# Patient Record
Sex: FEMALE | Race: WHITE | NOT HISPANIC OR LATINO | URBAN - METROPOLITAN AREA
[De-identification: names, ages, dates, MRNs, and addresses within clinical notes are randomized per-mention and may not be internally consistent; named-entity substitution may affect disease eponyms.]

---

## 2024-02-05 ENCOUNTER — LAB REQUISITION (OUTPATIENT)
Dept: LAB | Facility: HOSPITAL | Age: 79
End: 2024-02-05
Payer: MEDICARE

## 2024-02-05 DIAGNOSIS — R53.1 WEAKNESS: ICD-10-CM

## 2024-02-05 DIAGNOSIS — I10 ESSENTIAL (PRIMARY) HYPERTENSION: ICD-10-CM

## 2024-02-05 LAB
ANION GAP SERPL CALC-SCNC: 11 MMOL/L (ref 10–20)
BASOPHILS # BLD AUTO: 0.04 X10*3/UL (ref 0–0.1)
BASOPHILS NFR BLD AUTO: 0.3 %
BUN SERPL-MCNC: 19 MG/DL (ref 6–23)
CALCIUM SERPL-MCNC: 7.7 MG/DL (ref 8.6–10.3)
CHLORIDE SERPL-SCNC: 100 MMOL/L (ref 98–107)
CO2 SERPL-SCNC: 28 MMOL/L (ref 21–32)
CREAT SERPL-MCNC: 0.54 MG/DL (ref 0.5–1.05)
EGFRCR SERPLBLD CKD-EPI 2021: >90 ML/MIN/1.73M*2
EOSINOPHIL # BLD AUTO: 0.08 X10*3/UL (ref 0–0.4)
EOSINOPHIL NFR BLD AUTO: 0.5 %
ERYTHROCYTE [DISTWIDTH] IN BLOOD BY AUTOMATED COUNT: 13.4 % (ref 11.5–14.5)
GLUCOSE SERPL-MCNC: 144 MG/DL (ref 74–99)
HCT VFR BLD AUTO: 27.7 % (ref 36–46)
HGB BLD-MCNC: 8.8 G/DL (ref 12–16)
IMM GRANULOCYTES # BLD AUTO: 0.15 X10*3/UL (ref 0–0.5)
IMM GRANULOCYTES NFR BLD AUTO: 1 % (ref 0–0.9)
LYMPHOCYTES # BLD AUTO: 1.18 X10*3/UL (ref 0.8–3)
LYMPHOCYTES NFR BLD AUTO: 7.8 %
MCH RBC QN AUTO: 28.9 PG (ref 26–34)
MCHC RBC AUTO-ENTMCNC: 31.8 G/DL (ref 32–36)
MCV RBC AUTO: 91 FL (ref 80–100)
MONOCYTES # BLD AUTO: 0.92 X10*3/UL (ref 0.05–0.8)
MONOCYTES NFR BLD AUTO: 6.1 %
NEUTROPHILS # BLD AUTO: 12.81 X10*3/UL (ref 1.6–5.5)
NEUTROPHILS NFR BLD AUTO: 84.3 %
NRBC BLD-RTO: 0 /100 WBCS (ref 0–0)
PLATELET # BLD AUTO: 292 X10*3/UL (ref 150–450)
POTASSIUM SERPL-SCNC: 4.2 MMOL/L (ref 3.5–5.3)
RBC # BLD AUTO: 3.04 X10*6/UL (ref 4–5.2)
SODIUM SERPL-SCNC: 135 MMOL/L (ref 136–145)
WBC # BLD AUTO: 15.2 X10*3/UL (ref 4.4–11.3)

## 2024-02-05 PROCEDURE — 80048 BASIC METABOLIC PNL TOTAL CA: CPT

## 2024-02-05 PROCEDURE — 85025 COMPLETE CBC W/AUTO DIFF WBC: CPT

## 2024-02-06 ENCOUNTER — NURSING HOME VISIT (OUTPATIENT)
Dept: POST ACUTE CARE | Facility: EXTERNAL LOCATION | Age: 79
End: 2024-02-06
Payer: MEDICARE

## 2024-02-06 DIAGNOSIS — G93.41 SEPSIS WITH METABOLIC ENCEPHALOPATHY (MULTI): Primary | ICD-10-CM

## 2024-02-06 DIAGNOSIS — G40.909 SEIZURE DISORDER (MULTI): ICD-10-CM

## 2024-02-06 DIAGNOSIS — R65.20 SEPSIS WITH METABOLIC ENCEPHALOPATHY (MULTI): Primary | ICD-10-CM

## 2024-02-06 DIAGNOSIS — I43 CARDIOMYOPATHY, HYPERTENSIVE, BENIGN, WITHOUT HEART FAILURE (MULTI): ICD-10-CM

## 2024-02-06 DIAGNOSIS — U07.1 COVID-19 VIRUS INFECTION: ICD-10-CM

## 2024-02-06 DIAGNOSIS — N17.9 AKI (ACUTE KIDNEY INJURY) (CMS-HCC): ICD-10-CM

## 2024-02-06 DIAGNOSIS — I11.9 CARDIOMYOPATHY, HYPERTENSIVE, BENIGN, WITHOUT HEART FAILURE (MULTI): ICD-10-CM

## 2024-02-06 DIAGNOSIS — A41.9 SEPSIS WITH METABOLIC ENCEPHALOPATHY (MULTI): Primary | ICD-10-CM

## 2024-02-06 DIAGNOSIS — I48.91 ATRIAL FIBRILLATION, UNSPECIFIED TYPE (MULTI): ICD-10-CM

## 2024-02-06 PROCEDURE — 99305 1ST NF CARE MODERATE MDM 35: CPT | Performed by: EMERGENCY MEDICINE

## 2024-02-06 PROCEDURE — 99497 ADVNCD CARE PLAN 30 MIN: CPT | Performed by: EMERGENCY MEDICINE

## 2024-02-06 NOTE — LETTER
Patient: Marianne Canseco  : 1945    Encounter Date: 2024    Marianne Canseco   79 y.o.  female  @location@            Assessment and Plan:  History and physical  1. Acute onset sepsis A41.9 2. Acute UTI N39.0 3. COVID-19 virus infection U07.1 4. Dehydration E86.0 Acute cystitis with hematuria N30.01 Contusion of right lower leg S80.11XA Hypernatremia E87.0 DC IV fluids  Recheck electrolytes  Monitor kidney function  Status post placement of the Arenas  May require nephrology evaluation  Possible urological procedures    -Acute COVID-19 infection, being monitored.  I have personally and independently reviewed and interpreted her laboratory tests, imaging studies, and the documentation from other healthcare providers.  She is approaching discharge to [facility].  I have discussed her medical care with Maria M Mcelroy, nurse practitioner.        -Can discharge when ready on no further antimicrobials    Other issues:  #E. coli UTI, for which she has completed her treatment with oral cephalexin  #Leukocytosis, which has resolved.  Her blood cultures have shown no growth  #ZAKIA with distended bladder and bilateral hydroureteronephrosis, being managed with Arenas catheter.  Her kidney function has improved to baseline with resolution of the obstruction  #Bilateral lower extremity venous stasis with possible right lower extremity cellulitis, for which she has completed her course of cephalexin  #Frequent falls with pelvic fractures  #Seizure disorder, which is a chronic condition for her  #Atrial fibrillation status post AV krishan ablation  #Hypertension, which is a chronic condition for her  #Cardiomyopathy with HFrEF  #Valvular heart disease status post mitral valve replacement     acetaminophen, 650 mg= 2 tabs, ORAL, [id number], PRN albuterol = Proventil, Ventolin(albuterol hfa 90mcg/inh = Ventolin, Proventil inhaler), 2 puffs, Inhalation, [id number], PRN cephalexin, 500 mg= 1 caps, ORAL, QID donepezil(Aricept), 5 mg= 1 tabs,  ORAL, DAILY ezetimibe, 10 mg= 1 tabs, ORAL, DAILY hydrochlorothiazide = HydroDIURIL, 25 mg= 1 tabs, ORAL, DAILY levETIRAcetam(Keppra), 500 mg= 1 tabs, ORAL, BID melatonin, 5 mg= 1 tabs, ORAL, [id number], PRN metoprolol(Lopressor = Metoprolol Tartrate), 25 mg= 1 tabs, ORAL, BID naloxone = Narcan, 0.4 mg= 1 mL, IV Push, PRN, PRN nitroglycerin(Nitrostat), 0.4 mg= 1 tabs, Sublingual, Q5MIN, PRN sodium chloride(Saline Flush), 3 mL, IV Push, [id number] sodium chloride(Saline Flush), 3 mL, IV Push, PRN, PRN spironolactone(Aldactone), 25 mg= 1 tabs, ORAL, DAILY WITH BREAKFAST TRIAD WOUND DRESSING, 1 wayne, Topical, DAILY    I discussed advanced care planning including the explanation and discussion of advanced directives. If patient does not have current up to date documents, examples and information provided on how to create both living will and power of . Patient was encouraged to work on completing these documents.  Information and advise was also provided on DO NOT RESUSCITATE and patient encouraged to consider this  Patient is not sure about DNR at this time.  CODE STATUS is on file with the facility    Source of history: Nurse, Medical personnel, Medical record, Patient.  History limitation: None.    HPI:  History and physical    Patient is unable to give any detailed history and therefore history is obtained from the chart  No acute complaints voiced by the patient or acute concerns raised by nursing    History of hospitalization-    Patient is an 78-year-old female with medical history significant for dementia, seizure disorder, diagnosed with COVID on 1/15/2023, brought to the ED today for concerns of generalized weakness with ongoing frequent falls.  Patient is a DNR CC arrest DO NOT INTUBATE.  Unable to obtain ROS due to patient's clinical condition/dementia. Patient seen in ED 2 days ago for right lower extremity pain, workup showed no acute fracture, no DVT or superficial thrombosis, demonstrated  soft tissue swelling, patient was DC'd back to nursing home.      Patient has been tachypneic with max respiratory rate 27, all other VS WNL.  BUN 44, glucose 167, , lactate 3.2, MDW 22.32, all other labs unremarkable at this time.  Positive for COVID.  Urinalysis demonstrates small blood, positive nitrite, small leukocyte esterases.  All imaging negative for any acute processes.  CT pelvis shows chronic fracture of left superior and inferior pubic rami and chronic fracture of the left posterior acetabulum. (meets criteria for sepsis).  Patient received 500 mL NS bolus, now running at 80 MLS/hour, 1G ceftriaxone IVPB while in ED.  Urine and blood cultures obtained and sent.      Problem List/Past Medical History Ongoing Acute chest pain Cardiac pacemaker in situ Cervical dysplasia Epilepsy Seizure disorder Procedure/Surgical History   esophagogastroduodenoscopy(EGD). (02/06/2020)   Colonoscopy. (02/06/2020)   knee surgery   pacemaker   appendectomy   heart valve replacement   cardiac surgery    Allergies Flonase statins sulfa drugs vomiting Social History   Alcohol - Denies Alcohol Use   Employment/School Highest education:High school   Home/Environment Lives with:[facility] *Living Situation Prior to AdmissionAssisted living Home equipment:Walker/Cane Monitoring Equipment in homeNone *Special Services and Community Resources Prior to AdmissionNone *Mobility Assistance Prior to AdmissionPartial assistance Home BarriersNone *Will patient require additional/new services upon discharge?No   Sexual   Substance Abuse - Denies Substance Abuse   Tobacco - Denies Tobacco Use Use:Never smoker Family History Breast cancer.: Negative: Mother, Father, Sister, Brother, Grandfather and Grandmother. Colon cancer.: Negative: Mother, Father, Sister, Brother, Grandfather and Grandmother. Heart disease: Mother and Father. Ovarian cancer.: Negative: Mother, Father, Sister, Brother, Grandfather and Grandmother.  Stroke.: Father.        Physical Exam:  Vital signs as per nursing/MA documentation were reviewed  General appearance: Alert and in no acute distress  HEENT: Normal Inspection  Neck - Normal Inspection  Respiratory : No respiratory distress. Lungs are clear   Cardiovascular: heart rate normal. No gallop  Back - normal inspection  Skin inspection:Warm  Musculoskeletal : No deformities  Neuro : Limited exam. Baseline    ROS: Review of symptoms is negative except for what is mentioned in HPI    Results/Data  Records including but not limited to electronic medical records, relevant lab work and imaging from patient's health care facility encounter were reviewed and independently verified      Charting was completed using voice recognition technology and may include unintended errors.    Discussed with patient/family, RN, and NP.      Electronically Signed By: Huan Alvarenga MD   2/8/24  5:35 PM

## 2024-02-08 PROBLEM — G93.41 SEPSIS WITH METABOLIC ENCEPHALOPATHY (MULTI): Status: ACTIVE | Noted: 2024-02-08

## 2024-02-08 PROBLEM — I43 CARDIOMYOPATHY, HYPERTENSIVE, BENIGN, WITHOUT HEART FAILURE (MULTI): Status: ACTIVE | Noted: 2024-02-08

## 2024-02-08 PROBLEM — N17.9 AKI (ACUTE KIDNEY INJURY) (CMS-HCC): Status: ACTIVE | Noted: 2024-02-08

## 2024-02-08 PROBLEM — R65.20 SEPSIS WITH METABOLIC ENCEPHALOPATHY (MULTI): Status: ACTIVE | Noted: 2024-02-08

## 2024-02-08 PROBLEM — U07.1 COVID-19 VIRUS INFECTION: Status: ACTIVE | Noted: 2024-02-08

## 2024-02-08 PROBLEM — I11.9 CARDIOMYOPATHY, HYPERTENSIVE, BENIGN, WITHOUT HEART FAILURE (MULTI): Status: ACTIVE | Noted: 2024-02-08

## 2024-02-08 PROBLEM — I48.91 ATRIAL FIBRILLATION (MULTI): Status: ACTIVE | Noted: 2024-02-08

## 2024-02-08 PROBLEM — G40.909 SEIZURE DISORDER (MULTI): Status: ACTIVE | Noted: 2024-02-08

## 2024-02-08 PROBLEM — A41.9 SEPSIS WITH METABOLIC ENCEPHALOPATHY (MULTI): Status: ACTIVE | Noted: 2024-02-08

## 2024-02-08 NOTE — PROGRESS NOTES
Marianne Canseco   79 y.o.  female  @location@            Assessment and Plan:  History and physical  1. Acute onset sepsis A41.9 2. Acute UTI N39.0 3. COVID-19 virus infection U07.1 4. Dehydration E86.0 Acute cystitis with hematuria N30.01 Contusion of right lower leg S80.11XA Hypernatremia E87.0 DC IV fluids  Recheck electrolytes  Monitor kidney function  Status post placement of the Arenas  May require nephrology evaluation  Possible urological procedures    -Acute COVID-19 infection, being monitored.  I have personally and independently reviewed and interpreted her laboratory tests, imaging studies, and the documentation from other healthcare providers.  She is approaching discharge to [facility].  I have discussed her medical care with Maria M Mcelroy, nurse practitioner.        -Can discharge when ready on no further antimicrobials    Other issues:  #E. coli UTI, for which she has completed her treatment with oral cephalexin  #Leukocytosis, which has resolved.  Her blood cultures have shown no growth  #ZAKIA with distended bladder and bilateral hydroureteronephrosis, being managed with Arenas catheter.  Her kidney function has improved to baseline with resolution of the obstruction  #Bilateral lower extremity venous stasis with possible right lower extremity cellulitis, for which she has completed her course of cephalexin  #Frequent falls with pelvic fractures  #Seizure disorder, which is a chronic condition for her  #Atrial fibrillation status post AV krishan ablation  #Hypertension, which is a chronic condition for her  #Cardiomyopathy with HFrEF  #Valvular heart disease status post mitral valve replacement     acetaminophen, 650 mg= 2 tabs, ORAL, [id number], PRN albuterol = Proventil, Ventolin(albuterol hfa 90mcg/inh = Ventolin, Proventil inhaler), 2 puffs, Inhalation, [id number], PRN cephalexin, 500 mg= 1 caps, ORAL, QID donepezil(Aricept), 5 mg= 1 tabs, ORAL, DAILY ezetimibe, 10 mg= 1 tabs, ORAL, DAILY hydrochlorothiazide =  HydroDIURIL, 25 mg= 1 tabs, ORAL, DAILY levETIRAcetam(Keppra), 500 mg= 1 tabs, ORAL, BID melatonin, 5 mg= 1 tabs, ORAL, [id number], PRN metoprolol(Lopressor = Metoprolol Tartrate), 25 mg= 1 tabs, ORAL, BID naloxone = Narcan, 0.4 mg= 1 mL, IV Push, PRN, PRN nitroglycerin(Nitrostat), 0.4 mg= 1 tabs, Sublingual, Q5MIN, PRN sodium chloride(Saline Flush), 3 mL, IV Push, [id number] sodium chloride(Saline Flush), 3 mL, IV Push, PRN, PRN spironolactone(Aldactone), 25 mg= 1 tabs, ORAL, DAILY WITH BREAKFAST TRIAD WOUND DRESSING, 1 wayne, Topical, DAILY    I discussed advanced care planning including the explanation and discussion of advanced directives. If patient does not have current up to date documents, examples and information provided on how to create both living will and power of . Patient was encouraged to work on completing these documents.  Information and advise was also provided on DO NOT RESUSCITATE and patient encouraged to consider this  Patient is not sure about DNR at this time.  CODE STATUS is on file with the facility    Source of history: Nurse, Medical personnel, Medical record, Patient.  History limitation: None.    HPI:  History and physical    Patient is unable to give any detailed history and therefore history is obtained from the chart  No acute complaints voiced by the patient or acute concerns raised by nursing    History of hospitalization-    Patient is an 78-year-old female with medical history significant for dementia, seizure disorder, diagnosed with COVID on 1/15/2023, brought to the ED today for concerns of generalized weakness with ongoing frequent falls.  Patient is a DNR CC arrest DO NOT INTUBATE.  Unable to obtain ROS due to patient's clinical condition/dementia. Patient seen in ED 2 days ago for right lower extremity pain, workup showed no acute fracture, no DVT or superficial thrombosis, demonstrated soft tissue swelling, patient was DC'd back to nursing home.      Patient  has been tachypneic with max respiratory rate 27, all other VS WNL.  BUN 44, glucose 167, , lactate 3.2, MDW 22.32, all other labs unremarkable at this time.  Positive for COVID.  Urinalysis demonstrates small blood, positive nitrite, small leukocyte esterases.  All imaging negative for any acute processes.  CT pelvis shows chronic fracture of left superior and inferior pubic rami and chronic fracture of the left posterior acetabulum. (meets criteria for sepsis).  Patient received 500 mL NS bolus, now running at 80 MLS/hour, 1G ceftriaxone IVPB while in ED.  Urine and blood cultures obtained and sent.      Problem List/Past Medical History Ongoing Acute chest pain Cardiac pacemaker in situ Cervical dysplasia Epilepsy Seizure disorder Procedure/Surgical History   esophagogastroduodenoscopy(EGD). (02/06/2020)   Colonoscopy. (02/06/2020)   knee surgery   pacemaker   appendectomy   heart valve replacement   cardiac surgery    Allergies Flonase statins sulfa drugs vomiting Social History   Alcohol - Denies Alcohol Use   Employment/School Highest education:High school   Home/Environment Lives with:[facility] *Living Situation Prior to AdmissionAssisted living Home equipment:Walker/Cane Monitoring Equipment in homeNone *Special Services and Community Resources Prior to AdmissionNone *Mobility Assistance Prior to AdmissionPartial assistance Home BarriersNone *Will patient require additional/new services upon discharge?No   Sexual   Substance Abuse - Denies Substance Abuse   Tobacco - Denies Tobacco Use Use:Never smoker Family History Breast cancer.: Negative: Mother, Father, Sister, Brother, Grandfather and Grandmother. Colon cancer.: Negative: Mother, Father, Sister, Brother, Grandfather and Grandmother. Heart disease: Mother and Father. Ovarian cancer.: Negative: Mother, Father, Sister, Brother, Grandfather and Grandmother. Stroke.: Father.        Physical Exam:  Vital signs as per nursing/MA  documentation were reviewed  General appearance: Alert and in no acute distress  HEENT: Normal Inspection  Neck - Normal Inspection  Respiratory : No respiratory distress. Lungs are clear   Cardiovascular: heart rate normal. No gallop  Back - normal inspection  Skin inspection:Warm  Musculoskeletal : No deformities  Neuro : Limited exam. Baseline    ROS: Review of symptoms is negative except for what is mentioned in HPI    Results/Data  Records including but not limited to electronic medical records, relevant lab work and imaging from patient's health care facility encounter were reviewed and independently verified      Charting was completed using voice recognition technology and may include unintended errors.    Discussed with patient/family, RN, and NP.

## 2024-03-14 ENCOUNTER — NURSING HOME VISIT (OUTPATIENT)
Dept: POST ACUTE CARE | Facility: EXTERNAL LOCATION | Age: 79
End: 2024-03-14
Payer: MEDICARE

## 2024-03-14 DIAGNOSIS — G40.909 SEIZURE DISORDER (MULTI): ICD-10-CM

## 2024-03-14 DIAGNOSIS — R65.20 SEPSIS WITH METABOLIC ENCEPHALOPATHY (MULTI): ICD-10-CM

## 2024-03-14 DIAGNOSIS — G93.41 SEPSIS WITH METABOLIC ENCEPHALOPATHY (MULTI): ICD-10-CM

## 2024-03-14 DIAGNOSIS — I48.91 ATRIAL FIBRILLATION, UNSPECIFIED TYPE (MULTI): ICD-10-CM

## 2024-03-14 DIAGNOSIS — I43 CARDIOMYOPATHY, HYPERTENSIVE, BENIGN, WITHOUT HEART FAILURE (MULTI): ICD-10-CM

## 2024-03-14 DIAGNOSIS — N17.9 AKI (ACUTE KIDNEY INJURY) (CMS-HCC): Primary | ICD-10-CM

## 2024-03-14 DIAGNOSIS — A41.9 SEPSIS WITH METABOLIC ENCEPHALOPATHY (MULTI): ICD-10-CM

## 2024-03-14 DIAGNOSIS — I11.9 CARDIOMYOPATHY, HYPERTENSIVE, BENIGN, WITHOUT HEART FAILURE (MULTI): ICD-10-CM

## 2024-03-14 PROCEDURE — 99309 SBSQ NF CARE MODERATE MDM 30: CPT | Performed by: EMERGENCY MEDICINE

## 2024-03-14 NOTE — LETTER
Patient: Marianne Canseco  : 1945    Encounter Date: 2024    Marianne Canseco   79 y.o.  female  @location@            Assessment and Plan:    1. Acute onset sepsis A41.9 2. Acute UTI N39.0 3. COVID-19 virus infection U07.1 4. Dehydration E86.0 Acute cystitis with hematuria N30.01 Contusion of right lower leg S80.11XA Hypernatremia E87.0 DC IV fluids  Recheck electrolytes  Monitor kidney function  Status post placement of the Arenas  May require nephrology evaluation  Possible urological procedures    -Acute COVID-19 infection, being monitored.  I have personally and independently reviewed and interpreted her laboratory tests, imaging studies, and the documentation from other healthcare providers.  She is approaching discharge to [facility].  I have discussed her medical care with Maria M Mcelroy, nurse practitioner.        -Can discharge when ready on no further antimicrobials    Other issues:  #E. coli UTI, for which she has completed her treatment with oral cephalexin  #Leukocytosis, which has resolved.  Her blood cultures have shown no growth  #ZAKIA with distended bladder and bilateral hydroureteronephrosis, being managed with Arenas catheter.  Her kidney function has improved to baseline with resolution of the obstruction  #Bilateral lower extremity venous stasis with possible right lower extremity cellulitis, for which she has completed her course of cephalexin  #Frequent falls with pelvic fractures  #Seizure disorder, which is a chronic condition for her  #Atrial fibrillation status post AV krishan ablation  #Hypertension, which is a chronic condition for her  #Cardiomyopathy with HFrEF  #Valvular heart disease status post mitral valve replacement     acetaminophen, 650 mg= 2 tabs, ORAL, [id number], PRN albuterol = Proventil, Ventolin(albuterol hfa 90mcg/inh = Ventolin, Proventil inhaler), 2 puffs, Inhalation, [id number], PRN cephalexin, 500 mg= 1 caps, ORAL, QID donepezil(Aricept), 5 mg= 1 tabs, ORAL, DAILY ezetimibe,  10 mg= 1 tabs, ORAL, DAILY hydrochlorothiazide = HydroDIURIL, 25 mg= 1 tabs, ORAL, DAILY levETIRAcetam(Keppra), 500 mg= 1 tabs, ORAL, BID melatonin, 5 mg= 1 tabs, ORAL, [id number], PRN metoprolol(Lopressor = Metoprolol Tartrate), 25 mg= 1 tabs, ORAL, BID naloxone = Narcan, 0.4 mg= 1 mL, IV Push, PRN, PRN nitroglycerin(Nitrostat), 0.4 mg= 1 tabs, Sublingual, Q5MIN, PRN sodium chloride(Saline Flush), 3 mL, IV Push, [id number] sodium chloride(Saline Flush), 3 mL, IV Push, PRN, PRN spironolactone(Aldactone), 25 mg= 1 tabs, ORAL, DAILY WITH BREAKFAST TRIAD WOUND DRESSING, 1 wayne, Topical, DAILY    -Fall prevention    -Cognitive engagement     -Monitor and treat blood pressure     -Aggressive decubitus ulcer prevention.     -Bowel and bladder care     -Optimal nutrition and supplementation as needed     -GI  and DVT prophylaxis     -Symptom control     -Ambulation as tolerated     -Will follow    Charting is done using voice recognition software and may contain errors which have not been completely corrected      Source of history: Nurse, Medical personnel, Medical record, Patient.  History limitation: None.    HPI:    Patient is unable to give any detailed history and therefore history is obtained from the chart  No acute complaints voiced by the patient or acute concerns raised by nursing    History of hospitalization-    Patient is an 78-year-old female with medical history significant for dementia, seizure disorder, diagnosed with COVID on 1/15/2023, brought to the ED today for concerns of generalized weakness with ongoing frequent falls.  Patient is a DNR CC arrest DO NOT INTUBATE.  Unable to obtain ROS due to patient's clinical condition/dementia. Patient seen in ED 2 days ago for right lower extremity pain, workup showed no acute fracture, no DVT or superficial thrombosis, demonstrated soft tissue swelling, patient was DC'd back to nursing home.      Patient has been tachypneic with max respiratory rate 27,  all other VS WNL.  BUN 44, glucose 167, , lactate 3.2, MDW 22.32, all other labs unremarkable at this time.  Positive for COVID.  Urinalysis demonstrates small blood, positive nitrite, small leukocyte esterases.  All imaging negative for any acute processes.  CT pelvis shows chronic fracture of left superior and inferior pubic rami and chronic fracture of the left posterior acetabulum. (meets criteria for sepsis).  Patient received 500 mL NS bolus, now running at 80 MLS/hour, 1G ceftriaxone IVPB while in ED.  Urine and blood cultures obtained and sent.      Problem List/Past Medical History Ongoing Acute chest pain Cardiac pacemaker in situ Cervical dysplasia Epilepsy Seizure disorder Procedure/Surgical History   esophagogastroduodenoscopy(EGD). (02/06/2020)   Colonoscopy. (02/06/2020)   knee surgery   pacemaker   appendectomy   heart valve replacement   cardiac surgery    Allergies Flonase statins sulfa drugs vomiting Social History   Alcohol - Denies Alcohol Use   Employment/School Highest education:High school   Home/Environment Lives with:[facility] *Living Situation Prior to AdmissionAssisted living Home equipment:Walker/Cane Monitoring Equipment in homeNone *Special Services and Community Resources Prior to AdmissionNone *Mobility Assistance Prior to AdmissionPartial assistance Home BarriersNone *Will patient require additional/new services upon discharge?No   Sexual   Substance Abuse - Denies Substance Abuse   Tobacco - Denies Tobacco Use Use:Never smoker Family History Breast cancer.: Negative: Mother, Father, Sister, Brother, Grandfather and Grandmother. Colon cancer.: Negative: Mother, Father, Sister, Brother, Grandfather and Grandmother. Heart disease: Mother and Father. Ovarian cancer.: Negative: Mother, Father, Sister, Brother, Grandfather and Grandmother. Stroke.: Father.        Physical Exam:  Vital signs as per nursing/MA documentation were reviewed  General appearance: Alert and  in no acute distress  HEENT: Normal Inspection  Neck - Normal Inspection  Respiratory : No respiratory distress. Lungs are clear   Cardiovascular: heart rate normal. No gallop  Back - normal inspection  Skin inspection:Warm  Musculoskeletal : No deformities  Neuro : Limited exam. Baseline    ROS: Review of symptoms is negative except for what is mentioned in HPI    Results/Data  Records including but not limited to electronic medical records, relevant lab work and imaging from patient's health care facility encounter were reviewed and independently verified      Charting was completed using voice recognition technology and may include unintended errors.    Discussed with patient/family, RN, and NP.      Electronically Signed By: Huan Alvarenga MD   3/30/24  3:22 PM

## 2024-03-30 NOTE — PROGRESS NOTES
Marianne Canseco   79 y.o.  female  @location@            Assessment and Plan:    1. Acute onset sepsis A41.9 2. Acute UTI N39.0 3. COVID-19 virus infection U07.1 4. Dehydration E86.0 Acute cystitis with hematuria N30.01 Contusion of right lower leg S80.11XA Hypernatremia E87.0 DC IV fluids  Recheck electrolytes  Monitor kidney function  Status post placement of the Arenas  May require nephrology evaluation  Possible urological procedures    -Acute COVID-19 infection, being monitored.  I have personally and independently reviewed and interpreted her laboratory tests, imaging studies, and the documentation from other healthcare providers.  She is approaching discharge to [facility].  I have discussed her medical care with Maria M Mcelroy, nurse practitioner.        -Can discharge when ready on no further antimicrobials    Other issues:  #E. coli UTI, for which she has completed her treatment with oral cephalexin  #Leukocytosis, which has resolved.  Her blood cultures have shown no growth  #ZAKIA with distended bladder and bilateral hydroureteronephrosis, being managed with Arenas catheter.  Her kidney function has improved to baseline with resolution of the obstruction  #Bilateral lower extremity venous stasis with possible right lower extremity cellulitis, for which she has completed her course of cephalexin  #Frequent falls with pelvic fractures  #Seizure disorder, which is a chronic condition for her  #Atrial fibrillation status post AV krishan ablation  #Hypertension, which is a chronic condition for her  #Cardiomyopathy with HFrEF  #Valvular heart disease status post mitral valve replacement     acetaminophen, 650 mg= 2 tabs, ORAL, [id number], PRN albuterol = Proventil, Ventolin(albuterol hfa 90mcg/inh = Ventolin, Proventil inhaler), 2 puffs, Inhalation, [id number], PRN cephalexin, 500 mg= 1 caps, ORAL, QID donepezil(Aricept), 5 mg= 1 tabs, ORAL, DAILY ezetimibe, 10 mg= 1 tabs, ORAL, DAILY hydrochlorothiazide = HydroDIURIL, 25 mg= 1  tabs, ORAL, DAILY levETIRAcetam(Keppra), 500 mg= 1 tabs, ORAL, BID melatonin, 5 mg= 1 tabs, ORAL, [id number], PRN metoprolol(Lopressor = Metoprolol Tartrate), 25 mg= 1 tabs, ORAL, BID naloxone = Narcan, 0.4 mg= 1 mL, IV Push, PRN, PRN nitroglycerin(Nitrostat), 0.4 mg= 1 tabs, Sublingual, Q5MIN, PRN sodium chloride(Saline Flush), 3 mL, IV Push, [id number] sodium chloride(Saline Flush), 3 mL, IV Push, PRN, PRN spironolactone(Aldactone), 25 mg= 1 tabs, ORAL, DAILY WITH BREAKFAST TRIAD WOUND DRESSING, 1 wayne, Topical, DAILY    -Fall prevention    -Cognitive engagement     -Monitor and treat blood pressure     -Aggressive decubitus ulcer prevention.     -Bowel and bladder care     -Optimal nutrition and supplementation as needed     -GI  and DVT prophylaxis     -Symptom control     -Ambulation as tolerated     -Will follow    Charting is done using voice recognition software and may contain errors which have not been completely corrected      Source of history: Nurse, Medical personnel, Medical record, Patient.  History limitation: None.    HPI:    Patient is unable to give any detailed history and therefore history is obtained from the chart  No acute complaints voiced by the patient or acute concerns raised by nursing    History of hospitalization-    Patient is an 78-year-old female with medical history significant for dementia, seizure disorder, diagnosed with COVID on 1/15/2023, brought to the ED today for concerns of generalized weakness with ongoing frequent falls.  Patient is a DNR CC arrest DO NOT INTUBATE.  Unable to obtain ROS due to patient's clinical condition/dementia. Patient seen in ED 2 days ago for right lower extremity pain, workup showed no acute fracture, no DVT or superficial thrombosis, demonstrated soft tissue swelling, patient was DC'd back to nursing home.      Patient has been tachypneic with max respiratory rate 27, all other VS WNL.  BUN 44, glucose 167, , lactate 3.2, MDW 22.32,  all other labs unremarkable at this time.  Positive for COVID.  Urinalysis demonstrates small blood, positive nitrite, small leukocyte esterases.  All imaging negative for any acute processes.  CT pelvis shows chronic fracture of left superior and inferior pubic rami and chronic fracture of the left posterior acetabulum. (meets criteria for sepsis).  Patient received 500 mL NS bolus, now running at 80 MLS/hour, 1G ceftriaxone IVPB while in ED.  Urine and blood cultures obtained and sent.      Problem List/Past Medical History Ongoing Acute chest pain Cardiac pacemaker in situ Cervical dysplasia Epilepsy Seizure disorder Procedure/Surgical History   esophagogastroduodenoscopy(EGD). (02/06/2020)   Colonoscopy. (02/06/2020)   knee surgery   pacemaker   appendectomy   heart valve replacement   cardiac surgery    Allergies Flonase statins sulfa drugs vomiting Social History   Alcohol - Denies Alcohol Use   Employment/School Highest education:High school   Home/Environment Lives with:[facility] *Living Situation Prior to AdmissionAssisted living Home equipment:Walker/Cane Monitoring Equipment in homeNone *Special Services and Community Resources Prior to AdmissionNone *Mobility Assistance Prior to AdmissionPartial assistance Home BarriersNone *Will patient require additional/new services upon discharge?No   Sexual   Substance Abuse - Denies Substance Abuse   Tobacco - Denies Tobacco Use Use:Never smoker Family History Breast cancer.: Negative: Mother, Father, Sister, Brother, Grandfather and Grandmother. Colon cancer.: Negative: Mother, Father, Sister, Brother, Grandfather and Grandmother. Heart disease: Mother and Father. Ovarian cancer.: Negative: Mother, Father, Sister, Brother, Grandfather and Grandmother. Stroke.: Father.        Physical Exam:  Vital signs as per nursing/MA documentation were reviewed  General appearance: Alert and in no acute distress  HEENT: Normal Inspection  Neck - Normal  Inspection  Respiratory : No respiratory distress. Lungs are clear   Cardiovascular: heart rate normal. No gallop  Back - normal inspection  Skin inspection:Warm  Musculoskeletal : No deformities  Neuro : Limited exam. Baseline    ROS: Review of symptoms is negative except for what is mentioned in HPI    Results/Data  Records including but not limited to electronic medical records, relevant lab work and imaging from patient's health care facility encounter were reviewed and independently verified      Charting was completed using voice recognition technology and may include unintended errors.    Discussed with patient/family, RN, and NP.

## 2024-08-29 ENCOUNTER — TELEPHONE (OUTPATIENT)
Dept: PRIMARY CARE | Facility: CLINIC | Age: 79
End: 2024-08-29
Payer: MEDICARE

## 2024-08-29 NOTE — TELEPHONE ENCOUNTER
Called numbers on file: Home phone/mobile number is not pt number, and sons mailbox is full. Attempted to contact to schedule mcw